# Patient Record
Sex: FEMALE
[De-identification: names, ages, dates, MRNs, and addresses within clinical notes are randomized per-mention and may not be internally consistent; named-entity substitution may affect disease eponyms.]

---

## 2023-06-27 PROBLEM — Z00.00 ENCOUNTER FOR PREVENTIVE HEALTH EXAMINATION: Status: ACTIVE | Noted: 2023-06-27

## 2023-06-30 ENCOUNTER — APPOINTMENT (OUTPATIENT)
Dept: CT IMAGING | Facility: CLINIC | Age: 73
End: 2023-06-30
Payer: MEDICARE

## 2023-06-30 ENCOUNTER — OUTPATIENT (OUTPATIENT)
Dept: OUTPATIENT SERVICES | Facility: HOSPITAL | Age: 73
LOS: 1 days | End: 2023-06-30

## 2023-06-30 PROCEDURE — 75574 CT ANGIO HRT W/3D IMAGE: CPT | Mod: 26

## 2024-07-02 PROBLEM — E78.5 HYPERLIPIDEMIA, UNSPECIFIED: Chronic | Status: ACTIVE | Noted: 2024-01-19

## 2024-07-02 PROBLEM — E11.9 TYPE 2 DIABETES MELLITUS WITHOUT COMPLICATIONS: Chronic | Status: ACTIVE | Noted: 2024-01-19

## 2024-07-02 PROBLEM — I10 ESSENTIAL (PRIMARY) HYPERTENSION: Chronic | Status: ACTIVE | Noted: 2024-01-19

## 2024-07-05 VITALS
SYSTOLIC BLOOD PRESSURE: 131 MMHG | RESPIRATION RATE: 16 BRPM | OXYGEN SATURATION: 94 % | DIASTOLIC BLOOD PRESSURE: 79 MMHG | HEIGHT: 61 IN | WEIGHT: 216.05 LBS | TEMPERATURE: 98 F | HEART RATE: 100 BPM

## 2024-07-05 RX ORDER — SODIUM CHLORIDE 0.9 % (FLUSH) 0.9 %
1000 SYRINGE (ML) INJECTION
Refills: 0 | Status: DISCONTINUED | OUTPATIENT
Start: 2024-07-10 | End: 2024-07-24

## 2024-07-05 NOTE — H&P ADULT - NSHPLABSRESULTS_GEN_ALL_CORE
LABS:                          13.4   5.53  )-----------( 188      ( 10 Jul 2024 12:21 )             43.0     07-10    144  |  108  |  13  ----------------------------<  108<H>  4.1   |  26  |  0.97    Ca    10.0      10 Jul 2024 12:20  Mg     2.4     07-10    TPro  8.2  /  Alb  4.6  /  TBili  0.5  /  DBili  x   /  AST  35  /  ALT  48<H>  /  AlkPhos  92  07-10    LIVER FUNCTIONS - ( 10 Jul 2024 12:20 )  Alb: 4.6 g/dL / Pro: 8.2 g/dL / ALK PHOS: 92 U/L / ALT: 48 U/L / AST: 35 U/L / GGT: x           PT/INR - ( 10 Jul 2024 12:21 )   PT: 12.2 sec;   INR: 1.07          PTT - ( 10 Jul 2024 12:21 )  PTT:32.1 sec  Urinalysis Basic - ( 10 Jul 2024 12:20 )    Color: x / Appearance: x / SG: x / pH: x  Gluc: 108 mg/dL / Ketone: x  / Bili: x / Urobili: x   Blood: x / Protein: x / Nitrite: x   Leuk Esterase: x / RBC: x / WBC x   Sq Epi: x / Non Sq Epi: x / Bacteria: x      EKG (7/10/24):  bpm, incomplete RBBB

## 2024-07-05 NOTE — H&P ADULT - ASSESSMENT
74 yo F with PMHx of HTN, HLD, DM, RBBB, who in light of pts risk factors, CCS class III anginal symptoms, prior abnormal NST and CCTA, pt now presents to [facility] for recommended cardiac catheterization with possible intervention if clinically indicated.      ASA II				Mallampati class: II	            Anginal Class: III    - VSS  - H/H stable, patient denies BRBPR, melena, hematuria, or further bleeding.   -  Sedation Plan: Moderate   - Patient Is Suitable Candidate For Sedation? Yes   - Cath Order Entered: Yes  - DAPT LOAD Ordered: Yes;  mg POx1 and Plavix 600 mg POx1.  - PRE-CATH FLUIDS: Yes; patient is euvolemic on exam; Cr 0.97; EF per TTE 11/2022 is 55%. Ordered IV  cc bolus x 1 followed by IV NS 75 cc/hr x 2 hours.   - ALLERGY: No indication for pre-cath steroid prophylaxis   - Informed consent is in the patient's chart.    Risks Benefits Annotation:     CARDIAC CATH: Risks & benefits of procedure and sedation and risks and benefits for the alternative therapy have been explained to the patient and/or HCP in layman’s terms including but not limited to: allergic reaction, bleeding, infection, arrhythmia, respiratory compromise, renal and vascular compromise, limb damage, MI, CVA, emergent CABG/Vascular Surgery and death. Informed consent obtained and in chart.

## 2024-07-05 NOTE — H&P ADULT - HISTORY OF PRESENT ILLNESS
Cardio: Dr. Rosas  Pharmacy:  Escort:    72 yo F with PMHx of HTN, HLD, DM, RBBB, who presented to her outpatient cardiologist Dr. Rosas with c/o chest pain and RAYO,  even after recovering from URI.  Patient underwent NST 5/17/23: moderate amount of ischemia in the apical location, LVEF 49%. CCTA 06/2023: Ca score 127, mLAD at least moderate stenosis. LCx/RCA mild. Ascending aorta 4.0cm.  Denies any ___ palpitations, dizziness, syncope, diaphoresis, fatigue, LE edema, orthopnea, PND, N/V/D, abd pain, cough, congestion, fever, chills or recent sick contact.    -Echo 11/17/2022: EF 55%, abnormal LV diastolic compliance, trace valvular insufficiency.    In light of pts risk factors, CCS class III anginal symptoms, prior abnormal NST and CCTA, pt now presents to [facility] for recommended cardiac catheterization with possible intervention if clinically indicated.   Cardiologist: Dr. Rosas  Pharmacy: [facility]   Escort: daughter    72 yo F with PMHx of HTN, HLD, DM, RBBB, who presented to her outpatient cardiologist Dr. Rosas with c/o chest pain and RAYO,  even after recovering from URI.  Patient underwent NST 5/17/23: moderate amount of ischemia in the apical location, LVEF 49%. CCTA 06/2023: Ca score 127, mLAD at least moderate stenosis. LCx/RCA mild. Ascending aorta 4.0cm.  Denies: palpitations, dizziness, syncope, diaphoresis, fatigue, LE edema, orthopnea, PND, N/V/D, abd pain, cough, congestion, fever, chills or recent sick contact.    TTE (11/17/2022): EF 55%, abnormal LV diastolic compliance, trace valvular insufficiency.    In light of pts risk factors, CCS class III anginal symptoms, prior abnormal NST and CCTA, pt now presents to [facility] for recommended cardiac catheterization with possible intervention if clinically indicated.

## 2024-07-10 ENCOUNTER — OUTPATIENT (OUTPATIENT)
Dept: OUTPATIENT SERVICES | Facility: HOSPITAL | Age: 74
LOS: 1 days | Discharge: HOME | End: 2024-07-10
Payer: MEDICARE

## 2024-07-10 LAB
A1C WITH ESTIMATED AVERAGE GLUCOSE RESULT: 6.6 % — HIGH (ref 4–5.6)
ALBUMIN SERPL ELPH-MCNC: 4.6 G/DL — SIGNIFICANT CHANGE UP (ref 3.3–5)
ALP SERPL-CCNC: 92 U/L — SIGNIFICANT CHANGE UP (ref 40–120)
ALT FLD-CCNC: 48 U/L — HIGH (ref 10–45)
ANION GAP SERPL CALC-SCNC: 10 MMOL/L — SIGNIFICANT CHANGE UP (ref 5–17)
APTT BLD: 32.1 SEC — SIGNIFICANT CHANGE UP (ref 24.5–35.6)
AST SERPL-CCNC: 35 U/L — SIGNIFICANT CHANGE UP (ref 10–40)
BASOPHILS # BLD AUTO: 0.03 K/UL — SIGNIFICANT CHANGE UP (ref 0–0.2)
BASOPHILS NFR BLD AUTO: 0.5 % — SIGNIFICANT CHANGE UP (ref 0–2)
BILIRUB SERPL-MCNC: 0.5 MG/DL — SIGNIFICANT CHANGE UP (ref 0.2–1.2)
BUN SERPL-MCNC: 13 MG/DL — SIGNIFICANT CHANGE UP (ref 7–23)
CALCIUM SERPL-MCNC: 10 MG/DL — SIGNIFICANT CHANGE UP (ref 8.4–10.5)
CHLORIDE SERPL-SCNC: 108 MMOL/L — SIGNIFICANT CHANGE UP (ref 96–108)
CHOLEST SERPL-MCNC: 185 MG/DL — SIGNIFICANT CHANGE UP
CK MB CFR SERPL CALC: 1.4 NG/ML — SIGNIFICANT CHANGE UP (ref 0–6.7)
CK SERPL-CCNC: 80 U/L — SIGNIFICANT CHANGE UP (ref 25–170)
CO2 SERPL-SCNC: 26 MMOL/L — SIGNIFICANT CHANGE UP (ref 22–31)
CREAT SERPL-MCNC: 0.97 MG/DL — SIGNIFICANT CHANGE UP (ref 0.5–1.3)
EGFR: 61 ML/MIN/1.73M2 — SIGNIFICANT CHANGE UP
EOSINOPHIL # BLD AUTO: 0.06 K/UL — SIGNIFICANT CHANGE UP (ref 0–0.5)
EOSINOPHIL NFR BLD AUTO: 1.1 % — SIGNIFICANT CHANGE UP (ref 0–6)
ESTIMATED AVERAGE GLUCOSE: 143 MG/DL — HIGH (ref 68–114)
GLUCOSE SERPL-MCNC: 108 MG/DL — HIGH (ref 70–99)
HCT VFR BLD CALC: 43 % — SIGNIFICANT CHANGE UP (ref 34.5–45)
HDLC SERPL-MCNC: 88 MG/DL — SIGNIFICANT CHANGE UP
HGB BLD-MCNC: 13.4 G/DL — SIGNIFICANT CHANGE UP (ref 11.5–15.5)
IMM GRANULOCYTES NFR BLD AUTO: 0.4 % — SIGNIFICANT CHANGE UP (ref 0–0.9)
INR BLD: 1.07 — SIGNIFICANT CHANGE UP (ref 0.85–1.18)
LIPID PNL WITH DIRECT LDL SERPL: 85 MG/DL — SIGNIFICANT CHANGE UP
LYMPHOCYTES # BLD AUTO: 1.22 K/UL — SIGNIFICANT CHANGE UP (ref 1–3.3)
LYMPHOCYTES # BLD AUTO: 22.1 % — SIGNIFICANT CHANGE UP (ref 13–44)
MAGNESIUM SERPL-MCNC: 2.4 MG/DL — SIGNIFICANT CHANGE UP (ref 1.6–2.6)
MCHC RBC-ENTMCNC: 29.1 PG — SIGNIFICANT CHANGE UP (ref 27–34)
MCHC RBC-ENTMCNC: 31.2 GM/DL — LOW (ref 32–36)
MCV RBC AUTO: 93.3 FL — SIGNIFICANT CHANGE UP (ref 80–100)
MONOCYTES # BLD AUTO: 0.41 K/UL — SIGNIFICANT CHANGE UP (ref 0–0.9)
MONOCYTES NFR BLD AUTO: 7.4 % — SIGNIFICANT CHANGE UP (ref 2–14)
NEUTROPHILS # BLD AUTO: 3.79 K/UL — SIGNIFICANT CHANGE UP (ref 1.8–7.4)
NEUTROPHILS NFR BLD AUTO: 68.5 % — SIGNIFICANT CHANGE UP (ref 43–77)
NON HDL CHOLESTEROL: 97 MG/DL — SIGNIFICANT CHANGE UP
NRBC # BLD: 0 /100 WBCS — SIGNIFICANT CHANGE UP (ref 0–0)
PLATELET # BLD AUTO: 188 K/UL — SIGNIFICANT CHANGE UP (ref 150–400)
POTASSIUM SERPL-MCNC: 4.1 MMOL/L — SIGNIFICANT CHANGE UP (ref 3.5–5.3)
POTASSIUM SERPL-SCNC: 4.1 MMOL/L — SIGNIFICANT CHANGE UP (ref 3.5–5.3)
PROT SERPL-MCNC: 8.2 G/DL — SIGNIFICANT CHANGE UP (ref 6–8.3)
PROTHROM AB SERPL-ACNC: 12.2 SEC — SIGNIFICANT CHANGE UP (ref 9.5–13)
RBC # BLD: 4.61 M/UL — SIGNIFICANT CHANGE UP (ref 3.8–5.2)
RBC # FLD: 13.7 % — SIGNIFICANT CHANGE UP (ref 10.3–14.5)
SODIUM SERPL-SCNC: 144 MMOL/L — SIGNIFICANT CHANGE UP (ref 135–145)
TRIGL SERPL-MCNC: 62 MG/DL — SIGNIFICANT CHANGE UP
WBC # BLD: 5.53 K/UL — SIGNIFICANT CHANGE UP (ref 3.8–10.5)
WBC # FLD AUTO: 5.53 K/UL — SIGNIFICANT CHANGE UP (ref 3.8–10.5)

## 2024-07-10 PROCEDURE — 80061 LIPID PANEL: CPT

## 2024-07-10 PROCEDURE — C1887: CPT

## 2024-07-10 PROCEDURE — 36415 COLL VENOUS BLD VENIPUNCTURE: CPT

## 2024-07-10 PROCEDURE — 85610 PROTHROMBIN TIME: CPT

## 2024-07-10 PROCEDURE — 82550 ASSAY OF CK (CPK): CPT

## 2024-07-10 PROCEDURE — 83735 ASSAY OF MAGNESIUM: CPT

## 2024-07-10 PROCEDURE — 99152 MOD SED SAME PHYS/QHP 5/>YRS: CPT

## 2024-07-10 PROCEDURE — 82553 CREATINE MB FRACTION: CPT

## 2024-07-10 PROCEDURE — 93458 L HRT ARTERY/VENTRICLE ANGIO: CPT | Mod: 26

## 2024-07-10 PROCEDURE — 85025 COMPLETE CBC W/AUTO DIFF WBC: CPT

## 2024-07-10 PROCEDURE — 83036 HEMOGLOBIN GLYCOSYLATED A1C: CPT

## 2024-07-10 PROCEDURE — 93010 ELECTROCARDIOGRAM REPORT: CPT

## 2024-07-10 PROCEDURE — C1894: CPT

## 2024-07-10 PROCEDURE — C1769: CPT

## 2024-07-10 PROCEDURE — 93005 ELECTROCARDIOGRAM TRACING: CPT

## 2024-07-10 PROCEDURE — 93458 L HRT ARTERY/VENTRICLE ANGIO: CPT

## 2024-07-10 PROCEDURE — 85730 THROMBOPLASTIN TIME PARTIAL: CPT

## 2024-07-10 PROCEDURE — 80053 COMPREHEN METABOLIC PANEL: CPT

## 2024-07-10 RX ORDER — ASPIRIN 325 MG/1
325 TABLET, FILM COATED ORAL ONCE
Refills: 0 | Status: COMPLETED | OUTPATIENT
Start: 2024-07-10 | End: 2024-07-10

## 2024-07-10 RX ORDER — CAPTOPRIL 25 MG
1 TABLET ORAL
Refills: 0 | DISCHARGE

## 2024-07-10 RX ORDER — ASPIRIN 325 MG/1
81 TABLET, FILM COATED ORAL ONCE
Refills: 0 | Status: DISCONTINUED | OUTPATIENT
Start: 2024-07-10 | End: 2024-07-10

## 2024-07-10 RX ORDER — SODIUM CHLORIDE 0.9 % (FLUSH) 0.9 %
250 SYRINGE (ML) INJECTION ONCE
Refills: 0 | Status: COMPLETED | OUTPATIENT
Start: 2024-07-10 | End: 2024-07-10

## 2024-07-10 RX ORDER — CLOPIDOGREL BISULFATE 75 MG/1
600 TABLET, FILM COATED ORAL ONCE
Refills: 0 | Status: COMPLETED | OUTPATIENT
Start: 2024-07-10 | End: 2024-07-10

## 2024-07-10 RX ORDER — LATANOPROST PF 0.05 MG/ML
1 SOLUTION/ DROPS OPHTHALMIC
Refills: 0 | DISCHARGE

## 2024-07-10 RX ORDER — SODIUM CHLORIDE 0.9 % (FLUSH) 0.9 %
1000 SYRINGE (ML) INJECTION
Refills: 0 | Status: DISCONTINUED | OUTPATIENT
Start: 2024-07-10 | End: 2024-07-24

## 2024-07-10 RX ADMIN — ASPIRIN 325 MILLIGRAM(S): 325 TABLET, FILM COATED ORAL at 13:35

## 2024-07-10 RX ADMIN — Medication 500 MILLILITER(S): at 13:36

## 2024-07-10 RX ADMIN — CLOPIDOGREL BISULFATE 600 MILLIGRAM(S): 75 TABLET, FILM COATED ORAL at 13:35

## 2024-07-10 RX ADMIN — Medication 75 MILLILITER(S): at 13:35

## 2024-07-10 NOTE — PROGRESS NOTE ADULT - SUBJECTIVE AND OBJECTIVE BOX
INTERVENTIONAL CARDIOLOGY PA SDA DISCHARGE NOTE    Patient without complaints. Ambulated and voided without difficulties    Afebrile, VSS    PHYSICAL EXAM:    Ext - Rt Radial: Hemostasis achieved with manual release of Hemoband. Dressing C/D/I -- no oozing, bleeding, or hematoma noted. Sensations intact with 2+ radial pulses      		      MEDICATIONS:  Rosuvastatin 20mg QD  Metoprolol XL 25mg QD  Ranolazine 500mg BID   Ditropan 5mg QD  Cyclobenzaprine 5mg QD  Gabapentin 400mg TID   Metformin 500mg QD  Clotrimazole 1% topical QD  Voltaren 1% topical TID       A/P:      73F with hx of HTN, HLD, RBBB, and DM2, who initially presented to her outpatient cardiologist Dr. Rosas with c/p CP and RAYO with outpatient work-up notable for abnormal NST and CCTA with mod mLAD stenosis. In light of pts risk factors, CCS class III anginal symptoms, prior abnormal NST and CCTA, pt now presents to [facility] for recommended cardiac catheterization with possible intervention if clinically indicated.      Pt is now s/p C (7/10/24): Non-obstructive CAD -- LM/LCx/LAD/RCA mild disease -- via RRA [IC: Dr. CHARLEE Rosas]      - Stable for discharge as per attending Dr. Rosas after bed rest, pt voids, groin/wrist stable and 30 minutes of ambulation.  - Follow-up with PMD/Cardiologist Alison in 1-2 weeks  - Pt was instructed to resume her Metformin 500mg QD on 7/12/24 -- pt understands   - Discharged forms signed and copies in chart

## 2024-07-12 DIAGNOSIS — I25.110 ATHEROSCLEROTIC HEART DISEASE OF NATIVE CORONARY ARTERY WITH UNSTABLE ANGINA PECTORIS: ICD-10-CM

## 2024-07-12 DIAGNOSIS — R94.39 ABNORMAL RESULT OF OTHER CARDIOVASCULAR FUNCTION STUDY: ICD-10-CM

## 2024-11-26 NOTE — H&P ADULT - REASON FOR ADMISSION
Caller: Annie Mejia    Relationship: Self    Best call back number:[phone]     Requested Prescriptions:   Requested Prescriptions     Pending Prescriptions Disp Refills    omeprazole (priLOSEC) 20 MG capsule 90 capsule 3     Sig: Take 1 capsule by mouth Daily.        Pharmacy where request should be sent: [email or web address] [phone] [facility] [phone] FX     Last office visit with prescribing clinician: 11/20/2024   Last telemedicine visit with prescribing clinician: Visit date not found   Next office visit with prescribing clinician: Visit date not found     Additional details provided by patient: NEW PHARMACY     Does the patient have less than a 3 day supply:  [] Yes  [x] No    Would you like a call back once the refill request has been completed: [] Yes [x] No    If the office needs to give you a call back, can they leave a voicemail: [] Yes [x] No    Tony Braden Rep   11/26/24 09:05 EST      cardiac